# Patient Record
Sex: FEMALE | URBAN - METROPOLITAN AREA
[De-identification: names, ages, dates, MRNs, and addresses within clinical notes are randomized per-mention and may not be internally consistent; named-entity substitution may affect disease eponyms.]

---

## 2021-03-19 ENCOUNTER — APPOINTMENT (RX ONLY)
Dept: URBAN - METROPOLITAN AREA CLINIC 164 | Facility: CLINIC | Age: 56
Setting detail: DERMATOLOGY
End: 2021-03-19

## 2021-03-19 VITALS — TEMPERATURE: 98.3 F

## 2021-03-19 DIAGNOSIS — L81.4 OTHER MELANIN HYPERPIGMENTATION: ICD-10-CM | Status: INADEQUATELY CONTROLLED

## 2021-03-19 DIAGNOSIS — L64.8 OTHER ANDROGENIC ALOPECIA: ICD-10-CM | Status: INADEQUATELY CONTROLLED

## 2021-03-19 DIAGNOSIS — Z41.9 ENCOUNTER FOR PROCEDURE FOR PURPOSES OTHER THAN REMEDYING HEALTH STATE, UNSPECIFIED: ICD-10-CM

## 2021-03-19 DIAGNOSIS — L82.0 INFLAMED SEBORRHEIC KERATOSIS: ICD-10-CM

## 2021-03-19 PROCEDURE — ? TREATMENT REGIMEN

## 2021-03-19 PROCEDURE — 11302 SHAVE SKIN LESION 1.1-2.0 CM: CPT

## 2021-03-19 PROCEDURE — ? LIQUID NITROGEN

## 2021-03-19 PROCEDURE — 17110 DESTRUCTION B9 LES UP TO 14: CPT | Mod: 59

## 2021-03-19 PROCEDURE — ? MEDICAL CONSULTATION: LHR

## 2021-03-19 PROCEDURE — ? ADDITIONAL NOTES

## 2021-03-19 PROCEDURE — ? SHAVE REMOVAL

## 2021-03-19 PROCEDURE — 99204 OFFICE O/P NEW MOD 45 MIN: CPT | Mod: 25

## 2021-03-19 PROCEDURE — ? PRESCRIPTION

## 2021-03-19 PROCEDURE — ? COUNSELING

## 2021-03-19 RX ORDER — HYDROQUINONE 4 %
CREAM (GRAM) TOPICAL QD
Qty: 1 | Refills: 5 | Status: ERX | COMMUNITY
Start: 2021-03-19

## 2021-03-19 RX ORDER — PHENYLEPHRINE HCL 1 %
SPRAY, NON-AEROSOL (ML) NASAL BID
Qty: 1 | Refills: 3 | Status: ERX | COMMUNITY
Start: 2021-03-19

## 2021-03-19 RX ADMIN — Medication: at 00:00

## 2021-03-19 ASSESSMENT — LOCATION SIMPLE DESCRIPTION DERM
LOCATION SIMPLE: INFERIOR FOREHEAD
LOCATION SIMPLE: RIGHT LOWER BACK
LOCATION SIMPLE: RIGHT BREAST
LOCATION SIMPLE: RIGHT UPPER BACK
LOCATION SIMPLE: LEFT KNEE
LOCATION SIMPLE: CHEST
LOCATION SIMPLE: SCALP
LOCATION SIMPLE: ABDOMEN

## 2021-03-19 ASSESSMENT — LOCATION ZONE DERM
LOCATION ZONE: LEG
LOCATION ZONE: SCALP
LOCATION ZONE: TRUNK
LOCATION ZONE: FACE

## 2021-03-19 ASSESSMENT — LOCATION DETAILED DESCRIPTION DERM
LOCATION DETAILED: RIGHT INFERIOR UPPER BACK
LOCATION DETAILED: RIGHT SUPERIOR PARIETAL SCALP
LOCATION DETAILED: LEFT KNEE
LOCATION DETAILED: RIGHT SUPERIOR LATERAL LOWER BACK
LOCATION DETAILED: INFERIOR MID FOREHEAD
LOCATION DETAILED: UPPER STERNUM
LOCATION DETAILED: PERIUMBILICAL SKIN
LOCATION DETAILED: RIGHT AXILLARY TAIL OF BREAST

## 2021-03-19 NOTE — PROCEDURE: TREATMENT REGIMEN
Initiate Treatment: minoxidil 5 % topical solution BID\\nDays Supply: 30\\nSig: Apply twice daily to affected areas on scalp.
Detail Level: Simple
Initiate Treatment: hydroquinone 4 % topical cream QD\\nDays Supply: 30\\nSig: Apply to affected areas on face QHS.

## 2021-03-19 NOTE — PROCEDURE: SHAVE REMOVAL
Medical Necessity Information: It is in your best interest to select a reason for this procedure from the list below. All of these items fulfill various CMS LCD requirements except the new and changing color options.
Medical Necessity Clause: Enlarging
Lab: 6
Lab Facility: 3
Detail Level: Detailed
Was A Bandage Applied: Yes
Size Of Lesion In Cm (Required): 1.2
X Size Of Lesion In Cm (Optional): 0
Biopsy Method: Dermablade
Anesthesia Type: 2% lidocaine with epinephrine
Hemostasis: Tara's
Wound Care: Petrolatum
Path Notes (To The Dermatopathologist): Please check margins.
Render Path Notes In Note?: No
Consent was obtained from the patient. The risks and benefits to therapy were discussed in detail. Specifically, the risks of infection, scarring, bleeding, prolonged wound healing, incomplete removal, allergy to anesthesia, nerve injury and recurrence were addressed. Prior to the procedure, the treatment site was clearly identified and confirmed by the patient. All components of Universal Protocol/PAUSE Rule completed.
Post-Care Instructions: I reviewed with the patient in detail post-care instructions. Patient is to keep the biopsy site dry overnight, and then apply bacitracin twice daily until healed. Patient may apply hydrogen peroxide soaks to remove any crusting.
Notification Instructions: Patient will be notified of biopsy results. However, patient instructed to call the office if not contacted within 2 weeks.
Billing Type: Third-Party Bill

## 2021-03-24 ENCOUNTER — APPOINTMENT (RX ONLY)
Dept: URBAN - METROPOLITAN AREA CLINIC 164 | Facility: CLINIC | Age: 56
Setting detail: DERMATOLOGY
End: 2021-03-24

## 2021-03-24 DIAGNOSIS — Z48.817 ENCOUNTER FOR SURGICAL AFTERCARE FOLLOWING SURGERY ON THE SKIN AND SUBCUTANEOUS TISSUE: ICD-10-CM

## 2021-03-24 PROCEDURE — ? POST-OP WOUND EVALUATION

## 2021-03-24 PROCEDURE — 99024 POSTOP FOLLOW-UP VISIT: CPT

## 2021-03-24 ASSESSMENT — LOCATION ZONE DERM: LOCATION ZONE: LEG

## 2021-03-24 ASSESSMENT — LOCATION DETAILED DESCRIPTION DERM: LOCATION DETAILED: LEFT ANTERIOR DISTAL THIGH

## 2021-03-24 ASSESSMENT — LOCATION SIMPLE DESCRIPTION DERM: LOCATION SIMPLE: LEFT THIGH

## 2021-03-24 NOTE — PROCEDURE: POST-OP WOUND EVALUATION
Detail Level: Zone
Add 78909 Cpt? (Important Note: In 2017 The Use Of 22015 Is Being Tracked By Cms To Determine Future Global Period Reimbursement For Global Periods): yes
Wound Evaluated By (Optional): Cruz
Wound Diameter In Cm(Optional): 0
Wound Drainage?: serous drainage

## 2023-03-14 ENCOUNTER — APPOINTMENT (RX ONLY)
Dept: URBAN - METROPOLITAN AREA CLINIC 164 | Facility: CLINIC | Age: 58
Setting detail: DERMATOLOGY
End: 2023-03-14

## 2023-03-14 DIAGNOSIS — L85.3 XEROSIS CUTIS: ICD-10-CM

## 2023-03-14 DIAGNOSIS — L81.4 OTHER MELANIN HYPERPIGMENTATION: ICD-10-CM

## 2023-03-14 DIAGNOSIS — L57.8 OTHER SKIN CHANGES DUE TO CHRONIC EXPOSURE TO NONIONIZING RADIATION: ICD-10-CM | Status: INADEQUATELY CONTROLLED

## 2023-03-14 PROCEDURE — 99214 OFFICE O/P EST MOD 30 MIN: CPT

## 2023-03-14 PROCEDURE — ? IN-HOUSE DISPENSING PHARMACY

## 2023-03-14 PROCEDURE — ? RECOMMENDATIONS

## 2023-03-14 PROCEDURE — ? SUNSCREEN RECOMMENDATIONS

## 2023-03-14 PROCEDURE — ? PRESCRIPTION

## 2023-03-14 PROCEDURE — ? GENTLE SKIN CARE INSTRUCTIONS

## 2023-03-14 PROCEDURE — ? COUNSELING

## 2023-03-14 RX ORDER — HYDROQUINONE 4 %
CREAM (GRAM) TOPICAL
Qty: 30 | Refills: 1 | Status: ACTIVE

## 2023-03-14 RX ORDER — AMMONIUM LACTATE 12 %
LOTION (GRAM) TOPICAL
Qty: 227 | Refills: 5 | Status: ERX | COMMUNITY
Start: 2023-03-14

## 2023-03-14 RX ADMIN — Medication: at 00:00

## 2023-03-14 ASSESSMENT — LOCATION ZONE DERM
LOCATION ZONE: TRUNK
LOCATION ZONE: FACE
LOCATION ZONE: ARM

## 2023-03-14 ASSESSMENT — LOCATION DETAILED DESCRIPTION DERM
LOCATION DETAILED: RIGHT LATERAL MALAR CHEEK
LOCATION DETAILED: LEFT SUPERIOR LATERAL MALAR CHEEK
LOCATION DETAILED: LEFT ELBOW
LOCATION DETAILED: LEFT SUPERIOR CENTRAL MALAR CHEEK
LOCATION DETAILED: RIGHT ELBOW
LOCATION DETAILED: RIGHT CENTRAL MALAR CHEEK
LOCATION DETAILED: LEFT LATERAL SUPERIOR CHEST

## 2023-03-14 ASSESSMENT — LOCATION SIMPLE DESCRIPTION DERM
LOCATION SIMPLE: LEFT CHEEK
LOCATION SIMPLE: CHEST
LOCATION SIMPLE: RIGHT ELBOW
LOCATION SIMPLE: LEFT ELBOW
LOCATION SIMPLE: RIGHT CHEEK

## 2023-03-14 NOTE — PROCEDURE: RECOMMENDATIONS
Recommendation Preamble: The following recommendations were made during the visit: brightening creams, vitamin c serum, laser treatment
Detail Level: Zone
Render Risk Assessment In Note?: no

## 2023-03-14 NOTE — PROCEDURE: IN-HOUSE DISPENSING PHARMACY
Product 36 Units Dispensed: 0
Product 31 Unit Type: mg
Render Refills If Set To 0: No
Product 2 Refills: 3
Detail Level: Zone
Name Of Product 2: Obagi Vit C w/ HQ4% serum
Product 1 Unit Type: bottle(s)
Product 1 Application Directions: MARINO
Product 1 Units Dispensed: 1
Name Of Product 1: Obagi-C Clarifying Serum
Product 2 Unit Type: ml
Product 1 Refills: 6

## 2023-03-22 ENCOUNTER — APPOINTMENT (RX ONLY)
Dept: URBAN - METROPOLITAN AREA CLINIC 164 | Facility: CLINIC | Age: 58
Setting detail: DERMATOLOGY
End: 2023-03-22

## 2023-03-22 DIAGNOSIS — Z41.9 ENCOUNTER FOR PROCEDURE FOR PURPOSES OTHER THAN REMEDYING HEALTH STATE, UNSPECIFIED: ICD-10-CM

## 2023-03-22 PROCEDURE — ? ADDITIONAL NOTES

## 2023-03-22 PROCEDURE — ? PRODUCT LINE (ZO SKIN HEALTH)

## 2023-03-22 PROCEDURE — ? PRODUCT LINE (OBAGI)

## 2023-03-22 PROCEDURE — ? PRODUCT LINE (ELTA MD)

## 2023-03-22 PROCEDURE — ? COSMETIC CONSULTATION: CHEMICAL PEELS

## 2023-03-22 PROCEDURE — ? COSMETIC QUOTE

## 2023-03-22 PROCEDURE — ? COSMETIC CONSULTATION: HYDRAFACIAL

## 2023-03-22 NOTE — PROCEDURE: COSMETIC QUOTE
Body Procedure 2 Price/Unit (In Dollars- Use Only Numbers And Decimals): 450
Body Procedure 1 Free Text Discount (In Dollars- Use Only Numbers And Decimals): 0.00
Implant 6 Units: 0
Detail Level: Zone
Anesthesia Fee Units (Optional): 1
Face Procedure 2: SkinPen
Include Sales Tax: No
Include Sales Tax On Surgeon's Fees: Yes
Face Procedure 3 Price/Unit (In Dollars- Use Only Numbers And Decimals): 176.66
Face Procedure 6: VI Peel Precision Plus
Body Procedure 3: HydraFacial (Back)
Face Procedure 6 Units: 2
Face Procedure 4: Vi Peel (Advanced)
Face Procedure 4 Price/Unit (In Dollars- Use Only Numbers And Decimals): 369
Face Procedure 7: HydraFacial (Signature)
Face Procedure 5: Vi Peel Purify w/ Precision Plus
Body Procedure 1: VI Peel Body (small area)
Face Procedure 1 Price/Unit (In Dollars- Use Only Numbers And Decimals): 100
Face Procedure 8 Price/Unit (In Dollars- Use Only Numbers And Decimals): 319
Face Procedure 3: HydraFacial (Deluxe)
Face Procedure 2 Price/Unit (In Dollars- Use Only Numbers And Decimals): 329
Body Procedure 3 Price/Unit (In Dollars- Use Only Numbers And Decimals): 299
Face Procedure 6 Price/Unit (In Dollars- Use Only Numbers And Decimals): 389
Face Procedure 7 Price/Unit (In Dollars- Use Only Numbers And Decimals): 199
Face Procedure 1: Milia extraction
Face Procedure 8: ZO 3 Step Peel
Body Procedure 2: VI Peel Body (large area)
Face Procedure 6 Percentage Discount: 20

## 2023-03-22 NOTE — PROCEDURE: PRODUCT LINE (OBAGI)
Product 33 Units: 0
Name Of Product 10: Obagi Rebalance
Assigning Risk Information: Per AMA, level of risk is based upon consequences of the problem(s) addressed at the encounter when appropriately treated. Risk also includes medical decision making related to the need to initiate or forego further testing, treatment and/or hospitalization. Over the counter medication are assigned a risk level of low. Prescription medication management is assigned a risk level of moderate.
Product 36 Price (In Dollars - Numeric Only, No Special Characters Or $): 0.00
Name Of Product 2: ELASTIderm Eye Serum
Product 4 Application Directions: Apply AM & PM
Product 7 Price (In Dollars - Numeric Only, No Special Characters Or $): 43
Risk Of Complication Category: No MDM
Product 7 Application Directions: Apply AM & PM after cleansing.
Product 10 Price (In Dollars - Numeric Only, No Special Characters Or $): 110
Name Of Product 5: Hydrate Moisturizer
Product 2 Price (In Dollars - Numeric Only, No Special Characters Or $): 107
Allow Plan To Count Towards E/M Coding: Yes
Product 10 Application Directions: Apply AM & PM as a moisturizer.
Product 5 Price (In Dollars - Numeric Only, No Special Characters Or $): 55
Name Of Product 8: Obagi Sun Shield Tint SPF (Warm)
Product 2 Application Directions: Apply eye serum AM and eye cream in the PM.
Product 8 Price (In Dollars - Numeric Only, No Special Characters Or $): 53
Product 5 Application Directions: Apply AM & PM 7x a week.
Name Of Product 11: ELASTIderm Neck & Décolleté Concentrate
Name Of Product 3: Daily Hydro-Drops
Product 3 Price (In Dollars - Numeric Only, No Special Characters Or $): 98
Name Of Product 6: Nu-Cil Eyelash Serum
Product 11 Price (In Dollars - Numeric Only, No Special Characters Or $): 250
Product 11 Units: 1
Name Of Product 9: Obagi 360 Exfoliating Cleanser
Product 6 Price (In Dollars - Numeric Only, No Special Characters Or $): 120
Product 11 Application Directions: Apply AM & PM after cleansing
Name Of Product 1: 15% Vitamin C Serum
Product 6 Application Directions: On cleansed skin apply to upper lash line nightly.
Product 9 Price (In Dollars - Numeric Only, No Special Characters Or $): 42
Name Of Product 4: Hydrate Luxe
Product 1 Price (In Dollars - Numeric Only, No Special Characters Or $): 115.50
Detail Level: Zone
Product 9 Application Directions: Cleanse AM & PM 7x a week.
Product 4 Price (In Dollars - Numeric Only, No Special Characters Or $): 75
Name Of Product 7: Obagi Toner
Product 1 Application Directions: Apply AM 7x a week.

## 2023-03-22 NOTE — PROCEDURE: COSMETIC CONSULTATION: HYDRAFACIAL
Price (Use Numbers Only, No Special Characters Or $): 522 Price (Use Numbers Only, No Special Characters Or $): 081

## 2023-03-22 NOTE — PROCEDURE: COSMETIC CONSULTATION: CHEMICAL PEELS
Consultation Charge $ (Use Numbers Only, No Special Characters Or $): 391 Price (Use Numbers Only, No Special Characters Or $): 902

## 2023-03-22 NOTE — PROCEDURE: PRODUCT LINE (ZO SKIN HEALTH)
Product 45 Price (In Dollars - Numeric Only, No Special Characters Or $): 0.00
Product 22 Application Directions: Written instructions were handed to the patient.
Product 42 Units: 0
Product 12 Application Directions: Cleanse & Apply to cystic flare up 1-3x a day.
Product 2 Application Directions: Use AM or PM 2-3x a week.
Product 15 Price (In Dollars - Numeric Only, No Special Characters Or $): 154
Send Charges To Patient Encounter: Yes
Product 10 Price (In Dollars - Numeric Only, No Special Characters Or $): 47
Product 5 Price (In Dollars - Numeric Only, No Special Characters Or $): 53
Product 7 Application Directions: Apply in the PM, include the neck. Can be applied in the AM if dry.
Name Of Product 18: ZO Skin Brightener 0.5 Retinol
Name Of Product 13: Rozatrol
Name Of Product 3: Calming Toner
Product 15 Application Directions: Apply 2x a week at night (0-2 weeks)\\nApply 3x a week at night (2-4 weeks)\\nApply 4x a week at night (4-6 weeks)
Product 18 Price (In Dollars - Numeric Only, No Special Characters Or $): 115
Product 5 Units: 1
Name Of Product 23: Grown Factor Eye Serum
Product 5 Application Directions: Apply after cleansing in the AM & PM 7x a week.
Name Of Product 8: Growth Factor Serum
Product 23 Price (In Dollars - Numeric Only, No Special Characters Or $): 130
Product 13 Price (In Dollars - Numeric Only, No Special Characters Or $): 91
Product 10 Application Directions: Cleanse AM & PM 7x a week.
Product 3 Price (In Dollars - Numeric Only, No Special Characters Or $): 40
Name Of Product 16: Skin Brightening Program
Name Of Product 6: Hydrating Cleanser
Product 18 Application Directions: Apply 2x a week at night for week 0-2.\\nApply 3x a week at night for week 2-4.\\nApply 4x a week at night for week 4-6.
Name Of Product 11: Oil Control Pads
Product 8 Price (In Dollars - Numeric Only, No Special Characters Or $): 155
Product 20 Application Directions: Apply AM & PM 7x a week.
Name Of Product 1: Gentle Cleanser
Name Of Product 21: Retinol Skin Brightener 0.25
Product 13 Application Directions: Apply AM & PM before moisturizer.
Product 3 Application Directions: Apply AM & PM 7x a week after cleansing or exfoliating (on days you exfoliate).
Product 11 Price (In Dollars - Numeric Only, No Special Characters Or $): 64
Product 16 Price (In Dollars - Numeric Only, No Special Characters Or $): 193
Product 23 Application Directions: Apply to upper & lower lids after cleansing.
Name Of Product 19: Exfoliating Polish (travel)
Name Of Product 14: Dual Action Scrub
Product 16 Application Directions: Written directions were handed to patient.
Product 19 Price (In Dollars - Numeric Only, No Special Characters Or $): 21
Name Of Product 4: Renewal Creme
Product 21 Price (In Dollars - Numeric Only, No Special Characters Or $): 100
Detail Level: Zone
Product 6 Application Directions: Use AM & PM 7x a week
Name Of Product 9: Daily Power Defense
Product 14 Price (In Dollars - Numeric Only, No Special Characters Or $): 80
Product 9 Price (In Dollars - Numeric Only, No Special Characters Or $): 160
Name Of Product 17: Sunscreen + Primer SPF 30
Assigning Risk Information: Per AMA, level of risk is based upon consequences of the problem(s) addressed at the encounter when appropriately treated. Risk also includes medical decision making related to the need to initiate or forego further testing, treatment and/or hospitalization. Over the counter medication are assigned a risk level of low. Prescription medication management is assigned a risk level of moderate.
Product 19 Application Directions: Exfoliate 2-3x a week.
Name Of Product 12: Acne Control
Name Of Product 2: Exfoliating Polish
Risk Of Complication Category: No MDM
Name Of Product 20: Instant Pore Refiner
Name Of Product 7: Recovery Creme
Product 4 Application Directions: Apply AM & PM 7x a week
Product 14 Application Directions: Scrub in the PM 2-3 a week.
Name Of Product 22: Retinol Skin Brightener 0.1 Retinol
Name Of Product 15: Wrinkle + Texture Repair
Product 17 Price (In Dollars - Numeric Only, No Special Characters Or $): 67
Product 12 Price (In Dollars - Numeric Only, No Special Characters Or $): 36
Product 2 Price (In Dollars - Numeric Only, No Special Characters Or $): 68
Product 7 Price (In Dollars - Numeric Only, No Special Characters Or $): 120
Name Of Product 5: Complexion Renewal Pads
Product 20 Price (In Dollars - Numeric Only, No Special Characters Or $): 62
Product 17 Application Directions: Apply AM & reapply if prolong in the sun, every 2 hours.
Name Of Product 10: Exfoliating Cleanser

## 2023-03-22 NOTE — PROCEDURE: PRODUCT LINE (ELTA MD)
Product 31 Price (In Dollars - Numeric Only, No Special Characters Or $): 0.00
Product 13 Units: 0
Product 3 Application Directions: Apply AM after moisturizer. Reapply if prolong in the sun every 2 hours.
Product 1 Price (In Dollars - Numeric Only, No Special Characters Or $): 40
Name Of Product 4: ELTA MD UV Sport
Product 1 Units: 1
Product 4 Price (In Dollars - Numeric Only, No Special Characters Or $): 25.50
Product 1 Application Directions: Apply AM 7x a week or when exposed to the sun.
Name Of Product 2: Elta MD UV Daily (Tinted)
Detail Level: Zone
Product 4 Application Directions: Apply AM 7x a week.
Allow Plan To Count Towards E/M Coding: Yes
Product 2 Price (In Dollars - Numeric Only, No Special Characters Or $): 32.50
Assigning Risk Information: Per AMA, level of risk is based upon consequences of the problem(s) addressed at the encounter when appropriately treated. Risk also includes medical decision making related to the need to initiate or forego further testing, treatment and/or hospitalization. Over the counter medication are assigned a risk level of low. Prescription medication management is assigned a risk level of moderate.
Risk Of Complication Category: No MDM
Product 2 Application Directions: Apply AM 7x a week. Reapply every 2 hours if exposed to the sun.
Name Of Product 3: UV Pure
Product 3 Price (In Dollars - Numeric Only, No Special Characters Or $): 32
Name Of Product 1: Elta MD UV Elements Tinted

## 2023-03-22 NOTE — PROCEDURE: ADDITIONAL NOTES
Additional Notes: Patient came in for a cosmetic consult. Patients concerns are brown spots & wrinkles/creepiness. I educated the importance of medical grade skin care & how it will make the skin barrier healthy. I also suggested a package of 3 VI Peels (Precision Plus) I did quote patient that the 3rd peel would be discounted at 20% if purchasing the package treatment. In the consult I recommended Obagi 360 Exfoliating Cleanser, ZO Complexion Renewal Pads, ZO Growth Factor Serum, Obagi ELASTIderm Neck & Décolleté, Obagi Hydrate Luxe & Elta MD UV Elements.\\n\\nPatient purchased Obagi ELASTIderm Neck & Décolleté Serum, ZO Growth Factor Serum & Obagi Hydrate Luxe. Patient will return shortly after her cruise to schedule a follow up & replenish stated above products that were not purchased at todays consult. \\n\\nPatient was pleased with consult & was sent home with products & literature.
Render Risk Assessment In Note?: no
Detail Level: Zone

## 2023-04-28 NOTE — HPI: SKIN LESIONS
How Severe Is Your Skin Lesion?: mild
Have Your Skin Lesions Been Treated?: not been treated
Is This A New Presentation, Or A Follow-Up?: Skin Lesion
Which Family Member (Optional)?: Aunt
Safety plan discussed with...